# Patient Record
Sex: FEMALE | Race: WHITE | NOT HISPANIC OR LATINO | URBAN - METROPOLITAN AREA
[De-identification: names, ages, dates, MRNs, and addresses within clinical notes are randomized per-mention and may not be internally consistent; named-entity substitution may affect disease eponyms.]

---

## 2018-01-15 ENCOUNTER — IMPORTED ENCOUNTER (OUTPATIENT)
Dept: URBAN - METROPOLITAN AREA CLINIC 9 | Facility: CLINIC | Age: 63
End: 2018-01-15

## 2018-12-20 NOTE — PATIENT DISCUSSION
Recommend Mini lower lift, +/- platysmaplasty, pre/post-tragel, SMAS to cheeks(discussed risks and benefits of sx. .. ).

## 2019-04-16 NOTE — PATIENT DISCUSSION
Raised Mole to LLL/Left upper cheek- may need to shave down in future-recommends waiting for it to dissipate on its own over 6 months.

## 2020-12-22 ENCOUNTER — IMPORTED ENCOUNTER (OUTPATIENT)
Dept: URBAN - METROPOLITAN AREA CLINIC 9 | Facility: CLINIC | Age: 65
End: 2020-12-22

## 2021-10-18 ASSESSMENT — VISUAL ACUITY
OD_SC: 20/50 + SN
OD_CC: 20/25 + SN
OS_SC: 20/50 - SN
OS_CC: 20/25 + SN
OS_SC: 20/50 - SN
OD_SC: 20/30 - SN

## 2021-10-18 ASSESSMENT — TONOMETRY
OS_IOP_MMHG: 18
OD_IOP_MMHG: 17
OD_IOP_MMHG: 16
OS_IOP_MMHG: 17

## 2021-12-21 ENCOUNTER — ESTABLISHED PATIENT (OUTPATIENT)
Dept: URBAN - METROPOLITAN AREA CLINIC 17 | Facility: CLINIC | Age: 66
End: 2021-12-21

## 2021-12-21 DIAGNOSIS — H25.13: ICD-10-CM

## 2021-12-21 DIAGNOSIS — E11.9: ICD-10-CM

## 2021-12-21 PROCEDURE — 92015 DETERMINE REFRACTIVE STATE: CPT

## 2021-12-21 PROCEDURE — 92014 COMPRE OPH EXAM EST PT 1/>: CPT

## 2021-12-21 ASSESSMENT — VISUAL ACUITY
OD_GLARE: 20/50
OS_SC: 20/40
OU_SC: 20/30-1
OS_GLARE: 20/30
OD_SC: 20/40

## 2021-12-21 ASSESSMENT — TONOMETRY
OS_IOP_MMHG: 17
OD_IOP_MMHG: 16

## 2023-01-03 ENCOUNTER — ESTABLISHED PATIENT (OUTPATIENT)
Dept: URBAN - METROPOLITAN AREA CLINIC 17 | Facility: CLINIC | Age: 68
End: 2023-01-03

## 2023-01-03 DIAGNOSIS — H35.3131: ICD-10-CM

## 2023-01-03 DIAGNOSIS — E11.9: ICD-10-CM

## 2023-01-03 DIAGNOSIS — H40.013: ICD-10-CM

## 2023-01-03 DIAGNOSIS — H25.13: ICD-10-CM

## 2023-01-03 PROCEDURE — 99214 OFFICE O/P EST MOD 30 MIN: CPT

## 2023-01-03 PROCEDURE — 92133 CPTRZD OPH DX IMG PST SGM ON: CPT

## 2023-01-03 PROCEDURE — 92015 DETERMINE REFRACTIVE STATE: CPT

## 2023-01-03 ASSESSMENT — TONOMETRY
OS_IOP_MMHG: 16
OD_IOP_MMHG: 18

## 2023-01-03 ASSESSMENT — VISUAL ACUITY
OD_SC: 20/40-2
OS_SC: 20/40
OS_GLARE: 20/200
OD_GLARE: 20/200

## 2024-12-02 ENCOUNTER — COMPREHENSIVE EXAM (OUTPATIENT)
Age: 69
End: 2024-12-02

## 2024-12-02 DIAGNOSIS — H25.13: ICD-10-CM

## 2024-12-02 DIAGNOSIS — H35.3131: ICD-10-CM

## 2024-12-02 DIAGNOSIS — E11.9: ICD-10-CM

## 2024-12-02 DIAGNOSIS — H40.013: ICD-10-CM

## 2024-12-02 PROCEDURE — 92133 CPTRZD OPH DX IMG PST SGM ON: CPT

## 2024-12-02 PROCEDURE — 92014 COMPRE OPH EXAM EST PT 1/>: CPT
